# Patient Record
Sex: FEMALE | Race: WHITE | Employment: UNEMPLOYED | URBAN - METROPOLITAN AREA
[De-identification: names, ages, dates, MRNs, and addresses within clinical notes are randomized per-mention and may not be internally consistent; named-entity substitution may affect disease eponyms.]

---

## 2023-01-24 ENCOUNTER — OFFICE VISIT (OUTPATIENT)
Dept: FAMILY MEDICINE CLINIC | Age: 48
End: 2023-01-24
Payer: COMMERCIAL

## 2023-01-24 VITALS
BODY MASS INDEX: 27.49 KG/M2 | DIASTOLIC BLOOD PRESSURE: 74 MMHG | SYSTOLIC BLOOD PRESSURE: 111 MMHG | TEMPERATURE: 98.9 F | HEART RATE: 81 BPM | WEIGHT: 161 LBS | OXYGEN SATURATION: 97 % | HEIGHT: 64 IN

## 2023-01-24 DIAGNOSIS — R01.1 HEART MURMUR: ICD-10-CM

## 2023-01-24 DIAGNOSIS — R51.9 WORSENING HEADACHES: ICD-10-CM

## 2023-01-24 DIAGNOSIS — Z96.21 COCHLEAR IMPLANT IN PLACE: ICD-10-CM

## 2023-01-24 DIAGNOSIS — L65.9 HAIR LOSS: Primary | ICD-10-CM

## 2023-01-24 DIAGNOSIS — R17 YELLOW SKIN: ICD-10-CM

## 2023-01-24 DIAGNOSIS — J34.89 SINUS PRESSURE: ICD-10-CM

## 2023-01-24 PROCEDURE — 99204 OFFICE O/P NEW MOD 45 MIN: CPT | Performed by: STUDENT IN AN ORGANIZED HEALTH CARE EDUCATION/TRAINING PROGRAM

## 2023-01-24 RX ORDER — LIDOCAINE HYDROCHLORIDE 20 MG/ML
SOLUTION ORAL; TOPICAL
COMMUNITY
Start: 2022-12-18

## 2023-01-24 RX ORDER — ZOLPIDEM TARTRATE 12.5 MG/1
TABLET, FILM COATED, EXTENDED RELEASE ORAL
COMMUNITY
Start: 2022-12-28

## 2023-01-24 RX ORDER — MINOXIDIL 5 %
1 SOLUTION, NON-ORAL TOPICAL DAILY
Qty: 120 ML | Refills: 2 | Status: SHIPPED | OUTPATIENT
Start: 2023-01-24

## 2023-01-24 RX ORDER — FLUTICASONE PROPIONATE 50 MCG
SPRAY, SUSPENSION (ML) NASAL
COMMUNITY
Start: 2023-01-11

## 2023-01-24 RX ORDER — FLUTICASONE PROPIONATE 50 MCG
2 SPRAY, SUSPENSION (ML) NASAL DAILY
COMMUNITY
Start: 2022-12-23

## 2023-01-24 RX ORDER — SODIUM, POTASSIUM,MAG SULFATES 17.5-3.13G
SOLUTION, RECONSTITUTED, ORAL ORAL
COMMUNITY
Start: 2023-01-11

## 2023-01-24 NOTE — PROGRESS NOTES
Martha No is an 52 y.o. female who presents for several concerns below    #yellow skin around the eyes  - present a few years and doesn't know what this is  - started out light and annoying, now almost all the time  - changes in severity from day to day but there most of the time  - no work up for this besides normal bilirubin 12/2022  - no pain, itching  - has pressure behind the eyes    #hair loss  - last had full head of hair 3 years ago  - decreased steadily over last several years   - starting to get a little wave back  - has to put on gray powder to cover up the skin  - OB checked the thyroid and that is normal  - hx a lump removed from thyroid several years ago   - tried to spray rogaine a little bit, had some growth on the corners     #fatigue  #sick often   Frequently gets sick or stays sick for a long time  Did just have bronchitis and does feel better  Eye burning - when she had covid.  Wasn't able to stay awake    #cochlear implant  - placed several years ago  - hearing issues since age [age] but total hearing loss a few years ago    #GI tract issues  - seeing specialist  - had an infection ?abdominal   - plan colonoscopy   - then laparscopic exploration surgery with OB    #murmur  - when mentioned this PE finding to patient, she endorses dizziness though denies LOC    #headaches  - brought up at end of visit  - endorses that speech changed in last 2 months per family, thinks it was because of multiple bouts bronchitis  - no numbness/tingling/weakness  - pressure behind eyes  - no eye exam recently  - headaches all of her life  - brain fog    Recent labs from OB  - normal TSH, testosterone, CMP (normal creatinine, potassium, sodium, calcium, total protein, AST/ALT, bilirubin)  - normal CNC (normal WBC, Hgb, MCV, platelet)  - normal vitamin D  HA1c 4.9%  - PAP NORMAL neg HPV, 12/07/2022    Allergies   No Known Allergies      Medications   Current Outpatient Medications   Medication Sig zolpidem CR (AMBIEN CR) 12.5 mg tablet     minoxidiL 5 % soln 1 Applicatorful by Apply Externally route daily. fluticasone propionate (FLONASE) 50 mcg/actuation nasal spray 2 Sprays daily. (Patient not taking: Reported on 1/24/2023)    Lidocaine Viscous 2 % solution GARGLE WITH 15MLS, THEN SPIT OUT, EVERY 6-8 HOURS FOR 3 DAYS (Patient not taking: Reported on 1/24/2023)    Purelax 17 gram packet  (Patient not taking: Reported on 1/24/2023)    sodium-potassium-mag sulfate (SUPREP) 17.5-3.13-1.6 gram solr oral solution  (Patient not taking: Reported on 1/24/2023)     No current facility-administered medications for this visit. Past surgical, medical and social history reviewed and updated as relevant to presenting concerns. ROS: See HPI      OBJECTIVE  Visit Vitals  /74   Pulse 81   Temp 98.9 °F (37.2 °C) (Oral)   Ht 5' 4\" (1.626 m)   Wt 161 lb (73 kg)   SpO2 97%   BMI 27.64 kg/m²       Physical Exam  General appearance - alert, well appearing, and in no distress  Eyes - pupils equal and reactive, extraocular eye movements intact  Ears - cochlear implants in place  Nose - normal and patent, no erythema, discharge or polyps  Mouth - mucous membranes moist, pharynx normal without lesions  Neck - supple, no significant adenopathy  Chest - clear to auscultation, no wheezes, rales or rhonchi, symmetric air entry  Heart - normal rate, regular rhythm, normal S1, S2, + II/VI systolic murmur, rubs, clicks or gallops  Abdomen - soft, nontender, nondistended, no masses or organomegaly  Neurological - alert, oriented, normal speech, no focal findings or movement disorder noted  Musculoskeletal - no joint tenderness, deformity or swelling  Extremities - peripheral pulses normal, no pedal edema, no clubbing or cyanosis  Skin - normal coloration and turgor, no rashes. Skin under eyes not yellow today. Hair pull test negative, no strands. No patches of hair loss or skin change on scalp. ASSESSMENT & PLAN  1. Hair loss  Timeline uncertain, though appears to have paused. Correlated to period of significant stress with other health issues and moving. Normal TSH, testosterone on OB labs. Suspect telogen effluvium secondary to stress. Since the loss has stopped, recommend trial of minoxidil to promote growth. - minoxidiL 5 % soln; 1 Applicatorful by Apply Externally route daily. Dispense: 120 mL; Refill: 2    2. Yellow skin  Normal bilirubin on OB labs. Possibly related to dehydration or gilbert's? But no scleral icetrus during episodes. - REFERRAL TO DERMATOLOGY    3. Sinus pressure  4. Worsening headaches  5. Cochlear implant in place  Pt reports lifelong headaches but different sensation recently. No one sided numbness/tingling/weakness. Cannot get MRI due to cochlear implant. Encouraged pt to also get eye exam  - CT SINUSES W WO CONT; Future  - CT HEAD WO CONT; Future  - REFERRAL TO ENT-OTOLARYNGOLOGY    6. Heart murmur  Given dizziness, will obtain echocardiogram. No LOC. Symptom mentioned after hour long visit, so no other work up obtained today, consider EKG at next visit. - ECHO ADULT COMPLETE; Future      Follow-up and Dispositions    Return in about 4 weeks (around 2/21/2023) for Sleep issues, review results. I have discussed the diagnosis with the patient and the intended plan as seen in the above orders. Patient verbalized understanding of the plan and agrees with the plan. The patient has received an after-visit summary and questions were answered concerning future plans. I have discussed medication side effects and warnings with the patient as well. Informed patient to return to the office if new symptoms arise.         Promise Lopez MD

## 2023-01-24 NOTE — PROGRESS NOTES
Identified pt with two pt identifiers(name and ). Reviewed record in preparation for visit and have obtained necessary documentation. All patient medications has been reviewed. Chief Complaint   Patient presents with    Establish Care     Was in [place] for 6 years, came back to [address] 3 months ago. C/o Hairloss    Currently taking hormone pill (Daylette) for pre menopausal    GI- bloating, abd pain, constipation and diarrhea    Yellowing of under eyes with HA \"pressure around the head and eyes\"    Sudden total hearing loss 2 hears ago and had surgery(cochlear implant) 2020- now its lifelong    Slurred speech started 2 months ago    Mammogram scheduled tomorrow  Colon cancer screening (2023)         Visit Vitals  /74   Pulse 81   Temp 98.9 °F (37.2 °C) (Oral)   Ht 5' 4\" (1.626 m)   Wt 161 lb (73 kg)   SpO2 97%   BMI 27.64 kg/m²     1. Have you been to the ER, urgent care clinic since your last visit? Hospitalized since your last visit? No    2. Have you seen or consulted any other health care providers outside of the [address] [place] [address] since your last visit? Include any pap smears or colon screening.  No

## 2023-02-21 ENCOUNTER — HOSPITAL ENCOUNTER (OUTPATIENT)
Dept: CT IMAGING | Age: 48
Discharge: HOME | End: 2023-02-21
Attending: STUDENT IN AN ORGANIZED HEALTH CARE EDUCATION/TRAINING PROGRAM
Payer: COMMERCIAL

## 2023-02-21 ENCOUNTER — HOSPITAL ENCOUNTER (OUTPATIENT)
Dept: NON INVASIVE DIAGNOSTICS | Age: 48
End: 2023-02-21
Attending: STUDENT IN AN ORGANIZED HEALTH CARE EDUCATION/TRAINING PROGRAM
Payer: COMMERCIAL

## 2023-02-21 ENCOUNTER — HOSPITAL ENCOUNTER (OUTPATIENT)
Dept: CT IMAGING | Age: 48
End: 2023-02-21
Attending: STUDENT IN AN ORGANIZED HEALTH CARE EDUCATION/TRAINING PROGRAM
Payer: COMMERCIAL

## 2023-02-21 DIAGNOSIS — J34.89 SINUS PRESSURE: ICD-10-CM

## 2023-02-21 DIAGNOSIS — R01.1 HEART MURMUR: ICD-10-CM

## 2023-02-21 DIAGNOSIS — Z96.21 COCHLEAR IMPLANT IN PLACE: ICD-10-CM

## 2023-02-21 DIAGNOSIS — R51.9 WORSENING HEADACHES: ICD-10-CM

## 2023-02-21 PROCEDURE — 74011000636 HC RX REV CODE- 636: Performed by: STUDENT IN AN ORGANIZED HEALTH CARE EDUCATION/TRAINING PROGRAM

## 2023-02-21 PROCEDURE — 93306 TTE W/DOPPLER COMPLETE: CPT

## 2023-02-21 PROCEDURE — 70450 CT HEAD/BRAIN W/O DYE: CPT

## 2023-02-21 PROCEDURE — 70488 CT MAXILLOFACIAL W/O & W/DYE: CPT

## 2023-02-21 RX ADMIN — IOPAMIDOL 100 ML: 755 INJECTION, SOLUTION INTRAVENOUS at 09:40

## 2023-02-22 LAB
ECHO AO ASC DIAM: 3.1 CM
ECHO AO ROOT DIAM: 2.8 CM
ECHO AV AREA PEAK VELOCITY: 2.7 CM2
ECHO AV AREA VTI: 2.6 CM2
ECHO AV CUSP MM: 1.6 CM
ECHO AV MEAN GRADIENT: 4 MMHG
ECHO AV MEAN VELOCITY: 1 M/S
ECHO AV PEAK GRADIENT: 8 MMHG
ECHO AV PEAK VELOCITY: 1.4 M/S
ECHO AV VELOCITY RATIO: 0.86
ECHO AV VTI: 29.7 CM
ECHO LA AREA 2C: 13.3 CM2
ECHO LA AREA 4C: 13.8 CM2
ECHO LA DIAMETER: 3.4 CM
ECHO LA MAJOR AXIS: 5 CM
ECHO LA MINOR AXIS: 5.2 CM
ECHO LA TO AORTIC ROOT RATIO: 1.21
ECHO LA VOL BP: 30 ML (ref 22–52)
ECHO LV E' LATERAL VELOCITY: 17 CM/S
ECHO LV E' SEPTAL VELOCITY: 16 CM/S
ECHO LV EDV A2C: 61 ML
ECHO LV EDV A4C: 83 ML
ECHO LV EJECTION FRACTION A2C: 73 %
ECHO LV EJECTION FRACTION A4C: 70 %
ECHO LV EJECTION FRACTION BIPLANE: 72 % (ref 55–100)
ECHO LV ESV A2C: 17 ML
ECHO LV ESV A4C: 25 ML
ECHO LV FRACTIONAL SHORTENING: 36 % (ref 28–44)
ECHO LV GLOBAL LONGITUDINAL STRAIN (GLS): -16.3 %
ECHO LV GLOBAL LONGITUDINAL STRAIN (GLS): -19.8 %
ECHO LV GLOBAL LONGITUDINAL STRAIN (GLS): -21.2 %
ECHO LV GLOBAL LONGITUDINAL STRAIN (GLS): -21.8 %
ECHO LV INTERNAL DIMENSION DIASTOLIC MMODE: 4.5 CM (ref 3.9–5.3)
ECHO LV INTERNAL DIMENSION DIASTOLIC: 4.5 CM (ref 3.9–5.3)
ECHO LV INTERNAL DIMENSION SYSTOLIC MMODE: 2.8 CM
ECHO LV INTERNAL DIMENSION SYSTOLIC: 2.9 CM
ECHO LV IVSD: 0.6 CM (ref 0.6–0.9)
ECHO LV MASS 2D: 78.9 G (ref 67–162)
ECHO LV POSTERIOR WALL DIASTOLIC MMODE: 0.5 CM (ref 0.6–0.9)
ECHO LV POSTERIOR WALL DIASTOLIC: 0.6 CM (ref 0.6–0.9)
ECHO LV RELATIVE WALL THICKNESS RATIO: 0.27
ECHO LVOT AREA: 3.1 CM2
ECHO LVOT AV VTI INDEX: 0.82
ECHO LVOT DIAM: 2 CM
ECHO LVOT MEAN GRADIENT: 3 MMHG
ECHO LVOT PEAK GRADIENT: 6 MMHG
ECHO LVOT PEAK VELOCITY: 1.2 M/S
ECHO LVOT SV: 76.9 ML
ECHO LVOT VTI: 24.5 CM
ECHO MV A VELOCITY: 0.6 M/S
ECHO MV E DECELERATION TIME (DT): 327 MS
ECHO MV E VELOCITY: 0.95 M/S
ECHO MV E/A RATIO: 1.58
ECHO MV E/E' LATERAL: 5.59
ECHO MV E/E' RATIO (AVERAGED): 5.76
ECHO MV E/E' SEPTAL: 5.94
ECHO PV MAX VELOCITY: 0.9 M/S
ECHO PV PEAK GRADIENT: 3 MMHG
ECHO RA AREA 4C: 12.3 CM2
ECHO RA VOLUME: 26 ML
ECHO RV INTERNAL DIMENSION: 2.9 CM
ECHO RV TAPSE: 2 CM (ref 1.7–?)
ECHO RVOT MEAN GRADIENT: 1 MMHG
ECHO RVOT PEAK GRADIENT: 2 MMHG
ECHO RVOT PEAK VELOCITY: 0.7 M/S
ECHO RVOT VTI: 16.3 CM
ECHO TV REGURGITANT MAX VELOCITY: 1.93 M/S
ECHO TV REGURGITANT PEAK GRADIENT: 15 MMHG

## 2023-02-27 ENCOUNTER — TELEPHONE (OUTPATIENT)
Dept: FAMILY MEDICINE CLINIC | Age: 48
End: 2023-02-27

## 2023-02-27 ENCOUNTER — OFFICE VISIT (OUTPATIENT)
Dept: FAMILY MEDICINE CLINIC | Age: 48
End: 2023-02-27
Payer: COMMERCIAL

## 2023-02-27 VITALS
DIASTOLIC BLOOD PRESSURE: 65 MMHG | OXYGEN SATURATION: 99 % | BODY MASS INDEX: 26.88 KG/M2 | TEMPERATURE: 98 F | WEIGHT: 156.6 LBS | SYSTOLIC BLOOD PRESSURE: 96 MMHG | HEART RATE: 78 BPM

## 2023-02-27 DIAGNOSIS — F51.01 PRIMARY INSOMNIA: Primary | ICD-10-CM

## 2023-02-27 PROCEDURE — 99214 OFFICE O/P EST MOD 30 MIN: CPT | Performed by: STUDENT IN AN ORGANIZED HEALTH CARE EDUCATION/TRAINING PROGRAM

## 2023-02-27 RX ORDER — ZOLPIDEM TARTRATE 12.5 MG/1
12.5 TABLET, FILM COATED, EXTENDED RELEASE ORAL
Qty: 15 TABLET | Refills: 0 | Status: SHIPPED | OUTPATIENT
Start: 2023-02-27

## 2023-02-27 RX ORDER — MIRTAZAPINE 15 MG/1
15 TABLET, FILM COATED ORAL
Qty: 30 TABLET | Refills: 1 | Status: SHIPPED | OUTPATIENT
Start: 2023-02-27

## 2023-02-27 NOTE — TELEPHONE ENCOUNTER
Good morning. Pt is scheduled for  today at 1:40 appt. She called and stated that she was informed that she had contact 3 days with someone that now test positive for Covid. Pt stated that she does not have symptoms and home test displays negative. She would like to know if you would be comfortable with her coming to her appt or would you prefer to complete a virtual visit?

## 2023-02-27 NOTE — PROGRESS NOTES
Identified pt with two pt identifiers(name and ). Reviewed record in preparation for visit and have obtained necessary documentation. All patient medications has been reviewed. Chief Complaint   Patient presents with    Insomnia     Follow up for sleep issues - needs refill for Ambien    Visit Vitals  BP 96/65   Pulse 78   Temp 98 °F (36.7 °C) (Oral)   Wt 156 lb 9.6 oz (71 kg)   SpO2 99%   BMI 26.88 kg/m²   1. Have you been to the ER, urgent care clinic since your last visit? Hospitalized since your last visit? No    2. Have you seen or consulted any other health care providers outside of the [address] [place] [address] since your last visit? Include any pap smears or colon screening.  No

## 2023-02-27 NOTE — PROGRESS NOTES
Keila Reese is an 52 y.o. female who presents for sleep concerns    #sleep  - off and on issue since having her third child  - sleeps on back because of soreness in right pelvis  - would be nice if she didn't have to take the sleeping pills  - third child also has trouble sleeping, he is 19. Youngest  - some years are good and some years are not good  - doesn't sleep through the night  - takes the sleeping pill and can go to sleep  - has to get up to use the bathroom periodically  - the pill only lasts about 5 hours  - sometimes awake for a little bit and go back to sleep  - sometimes gives herself terry period  - if she doesn't take anything then she doesn't sleep  - this makes the headaches and tinnitus worse  - OB prescribed [employer] called her and told her not to take it for too long  - time to bed: 10pm weekdays, later on weekends (12)  - time wake up: stays in bed until 8am, weekdays and weekends  - routine before bed: dinner at 6pm, unpacking/watching tv, taking care of things on her phone. Lately more listening.   - has not tried cognitive behavioral therapy for sleep  - does not snore  - lately doesn't feel refreshed when she wakes up   - thinks she is only getting 6 hours of sleep   - on ambien from her OB  - used to take trazodone but it stopped working  - best results when she was on cream of progesterone and melatonin but she is now on a pill with progesterone which has treated her erick-menopausal symptoms  - takes her about 30-60 minutes to fall back asleep    #eyes itching - since covid, has tried artificial tears. has appt with ophthalmology  #sinuses - normal imaging, has appt with ENT    Allergies   No Known Allergies      Medications   Current Outpatient Medications   Medication Sig    DROSPIRENONE-ESTRADIOL PO Take  by mouth. Takes for perimenopausal symptoms    mirtazapine (REMERON) 15 mg tablet Take 1 Tablet by mouth nightly.     zolpidem CR (AMBIEN CR) 12.5 mg tablet Take 1 Tablet by mouth nightly as needed for Sleep. Max Daily Amount: 12.5 mg.    minoxidiL 5 % soln 1 Applicatorful by Apply Externally route daily. (Patient not taking: Reported on 2/27/2023)     No current facility-administered medications for this visit. Past surgical, medical and social history reviewed and updated as relevant to presenting concerns. ROS: See HPI      OBJECTIVE  Visit Vitals  BP 96/65   Pulse 78   Temp 98 °F (36.7 °C) (Oral)   Wt 156 lb 9.6 oz (71 kg)   SpO2 99%   BMI 26.88 kg/m²       Physical Exam  General: No acute distress. HEENT: Conjunctiva are clear, sclera non-icteric. Respiratory: Normal work of breathing, talking in full sentences without issue  Musculoskeletal: Normal gait. Skin: No abnormalities or rashes   Neuro: Alert, oriented, speech clear and coherent  Psychiatric: Normal mood and affect    ASSESSMENT & PLAN  1. Primary insomnia  Discussed sleep hygiene, side effects of medications. Will refill ambien with goal of tapering off this medication. Signed CSA, UDS completed. Will trial mirtazapine in addition to lifestyle modifications. Follow up in 1 month to review efficacy. - mirtazapine (REMERON) 15 mg tablet; Take 1 Tablet by mouth nightly. Dispense: 30 Tablet; Refill: 1  - DRUG SCREEN, URINE; Future  - zolpidem CR (AMBIEN CR) 12.5 mg tablet; Take 1 Tablet by mouth nightly as needed for Sleep. Max Daily Amount: 12.5 mg.  Dispense: 15 Tablet; Refill: 0  - DRUG SCREEN, URINE      Follow-up and Dispositions    Return in about 4 weeks (around 3/27/2023) for Follow up sleep medication. I have discussed the diagnosis with the patient and the intended plan as seen in the above orders. Patient verbalized understanding of the plan and agrees with the plan. The patient has received an after-visit summary and questions were answered concerning future plans. I have discussed medication side effects and warnings with the patient as well.  Informed patient to return to the office if new symptoms arise.         Lydia Barnes MD

## 2023-02-28 LAB
AMPHET UR QL SCN: NEGATIVE
BARBITURATES UR QL SCN: NEGATIVE
BENZODIAZ UR QL: NEGATIVE
CANNABINOIDS UR QL SCN: NEGATIVE
COCAINE UR QL SCN: NEGATIVE
DRUG SCRN COMMENT,DRGCM: NORMAL
METHADONE UR QL: NEGATIVE
OPIATES UR QL: NEGATIVE
PCP UR QL: NEGATIVE

## 2023-05-18 ENCOUNTER — TELEPHONE (OUTPATIENT)
Age: 48
End: 2023-05-18

## 2023-05-18 DIAGNOSIS — F51.01 PRIMARY INSOMNIA: Primary | ICD-10-CM

## 2023-05-18 RX ORDER — TRAZODONE HYDROCHLORIDE 100 MG/1
100 TABLET ORAL NIGHTLY
Qty: 90 TABLET | Refills: 1 | Status: SHIPPED | OUTPATIENT
Start: 2023-05-18

## 2023-05-18 RX ORDER — DAPSONE 75 MG/G
GEL TOPICAL
COMMUNITY
Start: 2023-03-23

## 2023-05-18 RX ORDER — TRAZODONE HYDROCHLORIDE 50 MG/1
50 TABLET ORAL NIGHTLY
COMMUNITY
Start: 2023-05-01 | End: 2023-05-18 | Stop reason: SDUPTHER

## 2023-05-18 RX ORDER — GABAPENTIN 300 MG/1
CAPSULE ORAL
COMMUNITY
Start: 2023-05-09

## 2023-05-18 NOTE — TELEPHONE ENCOUNTER
Patient  came in and stated that patient has been trying to get a refill through [employer] without any success. He stated that she was using Trazodone 100mg and it has worked for her.  She needs a new prescription for the 100mg sent to:      [facility] [address] [place] [address]

## 2023-09-22 ENCOUNTER — HOSPITAL ENCOUNTER (OUTPATIENT)
Facility: HOSPITAL | Age: 48
Discharge: HOME | End: 2023-09-22
Attending: STUDENT IN AN ORGANIZED HEALTH CARE EDUCATION/TRAINING PROGRAM | Admitting: ANESTHESIOLOGY
Payer: COMMERCIAL

## 2023-09-22 VITALS
DIASTOLIC BLOOD PRESSURE: 61 MMHG | OXYGEN SATURATION: 100 % | TEMPERATURE: 98.9 F | BODY MASS INDEX: 26.46 KG/M2 | HEIGHT: 64 IN | WEIGHT: 155 LBS | RESPIRATION RATE: 16 BRPM | SYSTOLIC BLOOD PRESSURE: 97 MMHG | HEART RATE: 65 BPM

## 2023-09-22 DIAGNOSIS — N94.89 PELVIC CONGESTION SYNDROME: ICD-10-CM

## 2023-09-22 PROCEDURE — 6360000004 HC RX CONTRAST MEDICATION: Performed by: STUDENT IN AN ORGANIZED HEALTH CARE EDUCATION/TRAINING PROGRAM

## 2023-09-22 PROCEDURE — 2500000003 HC RX 250 WO HCPCS: Performed by: STUDENT IN AN ORGANIZED HEALTH CARE EDUCATION/TRAINING PROGRAM

## 2023-09-22 PROCEDURE — C1769 GUIDE WIRE: HCPCS

## 2023-09-22 PROCEDURE — 2580000003 HC RX 258: Performed by: STUDENT IN AN ORGANIZED HEALTH CARE EDUCATION/TRAINING PROGRAM

## 2023-09-22 PROCEDURE — 2709999900 IR VASC EMBOLIZE OCCLUDE ARTERY

## 2023-09-22 PROCEDURE — 6360000002 HC RX W HCPCS: Performed by: STUDENT IN AN ORGANIZED HEALTH CARE EDUCATION/TRAINING PROGRAM

## 2023-09-22 RX ORDER — SODIUM CHLORIDE 9 MG/ML
INJECTION, SOLUTION INTRAVENOUS CONTINUOUS PRN
Status: COMPLETED | OUTPATIENT
Start: 2023-09-22 | End: 2023-09-22

## 2023-09-22 RX ORDER — LIDOCAINE HYDROCHLORIDE 10 MG/ML
INJECTION, SOLUTION EPIDURAL; INFILTRATION; INTRACAUDAL; PERINEURAL PRN
Status: COMPLETED | OUTPATIENT
Start: 2023-09-22 | End: 2023-09-22

## 2023-09-22 RX ORDER — FENTANYL CITRATE 50 UG/ML
INJECTION, SOLUTION INTRAMUSCULAR; INTRAVENOUS PRN
Status: COMPLETED | OUTPATIENT
Start: 2023-09-22 | End: 2023-09-22

## 2023-09-22 RX ORDER — MIDAZOLAM HYDROCHLORIDE 2 MG/2ML
INJECTION, SOLUTION INTRAMUSCULAR; INTRAVENOUS PRN
Status: COMPLETED | OUTPATIENT
Start: 2023-09-22 | End: 2023-09-22

## 2023-09-22 RX ORDER — SODIUM TETRADECYL SULFATE 30 MG/ML
10 INJECTION, SOLUTION INTRAVENOUS ONCE
Status: DISCONTINUED | OUTPATIENT
Start: 2023-09-22 | End: 2023-09-22 | Stop reason: HOSPADM

## 2023-09-22 RX ADMIN — MIDAZOLAM HYDROCHLORIDE 2 MG: 1 INJECTION, SOLUTION INTRAMUSCULAR; INTRAVENOUS at 08:43

## 2023-09-22 RX ADMIN — MIDAZOLAM HYDROCHLORIDE 0.5 MG: 1 INJECTION, SOLUTION INTRAMUSCULAR; INTRAVENOUS at 09:08

## 2023-09-22 RX ADMIN — WATER 2000 MG: 1 INJECTION INTRAMUSCULAR; INTRAVENOUS; SUBCUTANEOUS at 08:47

## 2023-09-22 RX ADMIN — FENTANYL CITRATE 25 MCG: 50 INJECTION, SOLUTION INTRAMUSCULAR; INTRAVENOUS at 09:08

## 2023-09-22 RX ADMIN — IOPAMIDOL 113 ML: 755 INJECTION, SOLUTION INTRAVENOUS at 09:24

## 2023-09-22 RX ADMIN — SODIUM CHLORIDE 125 ML/HR: 9 INJECTION, SOLUTION INTRAVENOUS at 08:38

## 2023-09-22 RX ADMIN — LIDOCAINE HYDROCHLORIDE 2 ML: 10 INJECTION, SOLUTION EPIDURAL; INFILTRATION; INTRACAUDAL; PERINEURAL at 08:44

## 2023-09-22 RX ADMIN — FENTANYL CITRATE 50 MCG: 50 INJECTION, SOLUTION INTRAMUSCULAR; INTRAVENOUS at 08:43

## 2023-09-22 ASSESSMENT — PULMONARY FUNCTION TESTS
PIF_VALUE: 0

## 2023-09-22 NOTE — PROGRESS NOTES
Name of procedure:  PELVIC VENOGRAM     Sedation medications given: versed 2.5mg, fentanyl 75mcg     Sedation tolerated: well     Total Procedure time: 42min     Vital Signs: stable     Any complications related to procedure: see orders     Post Procedure Care Needed/order sets placed in connect care: see orders     Pt tolerated procedure well. VSS. No C/O pain. Dressing to site D&I. No bleeding or hematoma noted to site. IV D/Cd. Discharge instructions given. Copy on chart and copy given to pt. Pt verbalized understanding. Pt taken to car by wheelchair and taken home by family. NAD noted at time of discharge.

## 2023-09-22 NOTE — H&P
INTERVENTIONAL RADIOLOGY  Preoperative History and Physical      Patient:  Giselle Keating  :  1975  Age:  50 y.o. MRN:  [id number]  Today's Date:  2023      CC / HPI   Giselle Keating is a 50 y.o. female with a history of chronic pelvic pain who presents for pelvic venogram and ovarian vein embolization. PAST MEDICAL HISTORY  Past Medical History:   Diagnosis Date    Hearing loss        PAST SURGICAL HISTORY  Past Surgical History:   Procedure Laterality Date    COCHLEAR IMPLANT      OTHER SURGICAL HISTORY      vaginal wall lesion removed    OTHER SURGICAL HISTORY      tongue lesion removed    THYROIDECTOMY, PARTIAL      TONSILLECTOMY         SOCIAL HISTORY  Social History     Socioeconomic History    Marital status:      Spouse name: Not on file    Number of children: Not on file    Years of education: Not on file    Highest education level: Not on file   Occupational History    Not on file   Tobacco Use    Smoking status: Never    Smokeless tobacco: Never   Substance and Sexual Activity    Alcohol use: Not Currently    Drug use: Never    Sexual activity: Not on file   Other Topics Concern    Not on file   Social History Narrative    Not on file     Social Determinants of Health     Financial Resource Strain: Not on file   Food Insecurity: Not on file   Transportation Needs: Not on file   Physical Activity: Not on file   Stress: Not on file   Social Connections: Not on file   Intimate Partner Violence: Not on file   Housing Stability: Not on file       FAMILY HISTORY  History reviewed. No pertinent family history.     CURRENT MEDICATIONS  Current Facility-Administered Medications   Medication Dose Route Frequency Provider Last Rate Last Admin    sodium tetradecyl sulfate (SOTRADECOL) 3 % injection 10 mL  10 mL IntraVENous Once Alonzo Cox MD           ALLERGIES  No Known Allergies    DIAGNOSTIC STUDIES   IMAGING STUDIES  Relevant Imaging studies reviewed    LABS  No results found for:

## 2023-09-22 NOTE — PROGRESS NOTES
Pt arrives ambulatory to angio department accompanied by son for pelvic embo procedure. All assessments completed and consent was reviewed. Education given was regarding procedure, moderate sedation, post-procedure care and  management/follow-up. Opportunity for questions was provided and all questions and concerns were addressed.

## 2023-11-07 DIAGNOSIS — F51.01 PRIMARY INSOMNIA: ICD-10-CM

## 2023-11-07 RX ORDER — TRAZODONE HYDROCHLORIDE 100 MG/1
100 TABLET ORAL NIGHTLY
Qty: 90 TABLET | Refills: 1 | Status: SHIPPED | OUTPATIENT
Start: 2023-11-07

## 2023-11-12 ENCOUNTER — HOSPITAL ENCOUNTER (OUTPATIENT)
Facility: HOSPITAL | Age: 48
Discharge: HOME | End: 2023-11-15
Attending: STUDENT IN AN ORGANIZED HEALTH CARE EDUCATION/TRAINING PROGRAM
Payer: COMMERCIAL

## 2023-11-12 DIAGNOSIS — N94.89 PELVIC CONGESTION SYNDROME: ICD-10-CM

## 2023-11-12 PROCEDURE — 74177 CT ABD & PELVIS W/CONTRAST: CPT

## 2023-11-12 PROCEDURE — 6360000004 HC RX CONTRAST MEDICATION: Performed by: STUDENT IN AN ORGANIZED HEALTH CARE EDUCATION/TRAINING PROGRAM

## 2023-11-12 RX ADMIN — IOPAMIDOL 100 ML: 755 INJECTION, SOLUTION INTRAVENOUS at 14:18

## 2023-11-15 ENCOUNTER — OFFICE VISIT (OUTPATIENT)
Age: 48
End: 2023-11-15
Payer: COMMERCIAL

## 2023-11-15 VITALS
OXYGEN SATURATION: 100 % | BODY MASS INDEX: 27.93 KG/M2 | TEMPERATURE: 98.1 F | SYSTOLIC BLOOD PRESSURE: 110 MMHG | RESPIRATION RATE: 18 BRPM | HEIGHT: 64 IN | WEIGHT: 163.58 LBS | DIASTOLIC BLOOD PRESSURE: 76 MMHG | HEART RATE: 97 BPM

## 2023-11-15 DIAGNOSIS — I95.89 HYPOTENSION DUE TO HYPOVOLEMIA: Primary | ICD-10-CM

## 2023-11-15 DIAGNOSIS — E86.1 HYPOTENSION DUE TO HYPOVOLEMIA: Primary | ICD-10-CM

## 2023-11-15 PROCEDURE — 99213 OFFICE O/P EST LOW 20 MIN: CPT | Performed by: STUDENT IN AN ORGANIZED HEALTH CARE EDUCATION/TRAINING PROGRAM

## 2023-11-15 RX ORDER — PAROXETINE 10 MG/1
10 TABLET, FILM COATED ORAL DAILY
COMMUNITY
Start: 2023-11-09

## 2023-11-15 RX ORDER — DROSPIRENONE AND ETHINYL ESTRADIOL 0.03MG-3MG
1 KIT ORAL DAILY
COMMUNITY
Start: 2023-11-06

## 2023-11-15 SDOH — ECONOMIC STABILITY: FOOD INSECURITY: WITHIN THE PAST 12 MONTHS, YOU WORRIED THAT YOUR FOOD WOULD RUN OUT BEFORE YOU GOT MONEY TO BUY MORE.: NEVER TRUE

## 2023-11-15 SDOH — ECONOMIC STABILITY: HOUSING INSECURITY
IN THE LAST 12 MONTHS, WAS THERE A TIME WHEN YOU DID NOT HAVE A STEADY PLACE TO SLEEP OR SLEPT IN A SHELTER (INCLUDING NOW)?: NO

## 2023-11-15 SDOH — ECONOMIC STABILITY: FOOD INSECURITY: WITHIN THE PAST 12 MONTHS, THE FOOD YOU BOUGHT JUST DIDN'T LAST AND YOU DIDN'T HAVE MONEY TO GET MORE.: NEVER TRUE

## 2023-11-15 SDOH — ECONOMIC STABILITY: INCOME INSECURITY: HOW HARD IS IT FOR YOU TO PAY FOR THE VERY BASICS LIKE FOOD, HOUSING, MEDICAL CARE, AND HEATING?: NOT HARD AT ALL

## 2023-11-15 ASSESSMENT — PATIENT HEALTH QUESTIONNAIRE - PHQ9
2. FEELING DOWN, DEPRESSED OR HOPELESS: 0
SUM OF ALL RESPONSES TO PHQ9 QUESTIONS 1 & 2: 2
SUM OF ALL RESPONSES TO PHQ QUESTIONS 1-9: 2
1. LITTLE INTEREST OR PLEASURE IN DOING THINGS: 2

## 2023-11-15 NOTE — PROGRESS NOTES
[facility]      Chief Complaint:     Chief Complaint   Patient presents with    Hypotension     Couple of weeks ago       Mariposa Abernathy is a 50 y.o. female that presents for: Low BP      Assessment/Plan:   I personally reviewed the following Pertinent Labs/Studies:   - Encounter notes, labs, and imaging from 2/21/23. Pt is a 50year old female presenting with episodes of low blood pressure. Regine was seen today for hypotension. Diagnoses and all orders for this visit:    Hypotension due to hypovolemia: most likely 2/2 low volume from inadequate fluid intake in the s/o pre-existing low baseline BP. Orthostatics negative and BP normal today. No syncopal events or palpitations so cardiac workup not warranted at this time. Pt had echo in 02/2023 that was normal. If symptoms fail to respond to liberal fluid intake (encouraged at least 96 ounces daily) with added salt (recommended approx 3-4 grams daily total) then would need to pursue further workup at follow up in a couple weeks. Would check electrolytes, urinalysis, and blood counts at a minimum and possibly urine lytes, aldosterone and cortisol level to check for salt wasting or adrenal insufficiency. Recent AP CT without acute adrenal or renal anomaly. Follow up:     Return in about 2 weeks (around 11/29/2023) for follow up on hypotension. .     Subjective:   HPI:  Mariposa Abernathy is a 50 y.o. female that presents for:    Has had low BP per whole life. Usually doesn't bother her. 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 Have been 90's over 60's. Has had one episode of light-headedness when she stands up. No \"gray-outs\" (what she describes as almost blacking out). Has had some trouble focusing and feels the low BP's are behind it. No episodes of LOC. She denies SOB, diaphoresis, chest pain, palpitations, edema. Has frequent nighttime urination but no other urinary symptoms.

## 2023-11-29 ENCOUNTER — OFFICE VISIT (OUTPATIENT)
Age: 48
End: 2023-11-29
Payer: COMMERCIAL

## 2023-11-29 VITALS
WEIGHT: 163 LBS | TEMPERATURE: 98.7 F | DIASTOLIC BLOOD PRESSURE: 71 MMHG | RESPIRATION RATE: 18 BRPM | BODY MASS INDEX: 27.83 KG/M2 | HEART RATE: 83 BPM | HEIGHT: 64 IN | OXYGEN SATURATION: 98 % | SYSTOLIC BLOOD PRESSURE: 101 MMHG

## 2023-11-29 DIAGNOSIS — I95.9 HYPOTENSION, UNSPECIFIED HYPOTENSION TYPE: Primary | ICD-10-CM

## 2023-11-29 DIAGNOSIS — E89.0 HISTORY OF PARTIAL THYROIDECTOMY: ICD-10-CM

## 2023-11-29 PROBLEM — F98.8 ATTENTION DEFICIT DISORDER WITHOUT HYPERACTIVITY: Status: ACTIVE | Noted: 2023-11-29

## 2023-11-29 PROCEDURE — 99213 OFFICE O/P EST LOW 20 MIN: CPT | Performed by: FAMILY MEDICINE

## 2023-11-29 ASSESSMENT — PATIENT HEALTH QUESTIONNAIRE - PHQ9
SUM OF ALL RESPONSES TO PHQ QUESTIONS 1-9: 0
2. FEELING DOWN, DEPRESSED OR HOPELESS: 0
SUM OF ALL RESPONSES TO PHQ9 QUESTIONS 1 & 2: 0
SUM OF ALL RESPONSES TO PHQ QUESTIONS 1-9: 0
SUM OF ALL RESPONSES TO PHQ QUESTIONS 1-9: 0
1. LITTLE INTEREST OR PLEASURE IN DOING THINGS: 0
SUM OF ALL RESPONSES TO PHQ QUESTIONS 1-9: 0

## 2023-11-29 NOTE — PROGRESS NOTES
[facility] Office Visit     Assessment/ Plan: Arnaldo Abdullahi is a 50 y.o. female presenting for:    Hypotension - Chronic, unclear etiology. Here today to discuss further lab work-up. Has liberalized salt and water intake with mild improvement in symptoms. Work-up today to eval for thyroid dysfunction, adrenal insufficiency, salt wasting, anemia. Continue liberal salt and fluid intake. Unable to tolerate compression socks. ECHO earlier this year without significant findings, no valvular anomalies. Interestingly, is having work-up with gyn for ongoing pelvic pain/congestion and IBS issues. No significant pulmonary issues. BP Readings from Last 3 Encounters:   11/29/23 101/71   11/15/23 110/76   09/22/23 97/61   -- no significant tachycardia though could consider tilt-table testing with neuro  --  CBC, CMP, AM cortisol, aldosterone, Mg/Phos, urine studies, B12/folate, Vitamin D    20 minutes were spent on the day of this encounter both with the patient and in related activities including chart review, care coordination and counseling. Patient instructions were discussed and/or provided in the AVS. The patient understands and agrees to the plan. RETURN TO CARE: pending labs   No future appointments. Subjective:  Chief Complaint   Patient presents with    Hypotension     Patient is coming in for hypotension follow up. No other concerns.       HPI:   Arnaldo Abdullahi is a 50 y.o. female with PMH of cochlear implant, ADD here for hypotension.     - has had whole life  - worried about driving  - has never passed out, gets confusion/lack of focus with driving - was thought at some point ADHD  has tried caffeine, salt, water  - has seen cardiologist twice   - sometimes lightheadedness   - increased water intake since last visit  8-10 glasses per day, alternating with caffeine  always puts salt on food, switched to healthier eating   - couldn't wear compression hose/socks -

## 2023-12-01 ENCOUNTER — NURSE ONLY (OUTPATIENT)
Age: 48
End: 2023-12-01

## 2023-12-01 DIAGNOSIS — E89.0 HISTORY OF PARTIAL THYROIDECTOMY: ICD-10-CM

## 2023-12-01 DIAGNOSIS — I95.9 HYPOTENSION, UNSPECIFIED HYPOTENSION TYPE: ICD-10-CM

## 2023-12-01 LAB
25(OH)D3 SERPL-MCNC: 25.1 NG/ML (ref 30–100)
ALBUMIN SERPL-MCNC: 4 G/DL (ref 3.5–5)
ALBUMIN/GLOB SERPL: 1.3 (ref 1.1–2.2)
ALP SERPL-CCNC: 47 U/L (ref 45–117)
ALT SERPL-CCNC: 22 U/L (ref 12–78)
ANION GAP SERPL CALC-SCNC: 8 MMOL/L (ref 5–15)
APPEARANCE UR: CLEAR
AST SERPL-CCNC: 17 U/L (ref 15–37)
BACTERIA URNS QL MICRO: NEGATIVE /HPF
BASOPHILS # BLD: 0.1 K/UL (ref 0–0.1)
BASOPHILS NFR BLD: 1 % (ref 0–1)
BILIRUB SERPL-MCNC: 0.3 MG/DL (ref 0.2–1)
BILIRUB UR QL: NEGATIVE
BUN SERPL-MCNC: 13 MG/DL (ref 6–20)
BUN/CREAT SERPL: 19 (ref 12–20)
CALCIUM SERPL-MCNC: 9.2 MG/DL (ref 8.5–10.1)
CHLORIDE SERPL-SCNC: 101 MMOL/L (ref 97–108)
CO2 SERPL-SCNC: 24 MMOL/L (ref 21–32)
COLOR UR: NORMAL
CORTIS AM PEAK SERPL-MCNC: 19.2 UG/DL (ref 4.3–22.45)
CREAT SERPL-MCNC: 0.69 MG/DL (ref 0.55–1.02)
CREAT UR-MCNC: 37.6 MG/DL
DIFFERENTIAL METHOD BLD: NORMAL
EOSINOPHIL # BLD: 0.1 K/UL (ref 0–0.4)
EOSINOPHIL NFR BLD: 1 % (ref 0–7)
EPITH CASTS URNS QL MICRO: NORMAL /LPF
ERYTHROCYTE [DISTWIDTH] IN BLOOD BY AUTOMATED COUNT: 11.9 % (ref 11.5–14.5)
FOLATE SERPL-MCNC: 5.9 NG/ML (ref 5–21)
GLOBULIN SER CALC-MCNC: 3.2 G/DL (ref 2–4)
GLUCOSE SERPL-MCNC: 122 MG/DL (ref 65–100)
GLUCOSE UR STRIP.AUTO-MCNC: NEGATIVE MG/DL
HCT VFR BLD AUTO: 39.6 % (ref 35–47)
HGB BLD-MCNC: 13.4 G/DL (ref 11.5–16)
HGB UR QL STRIP: NEGATIVE
HYALINE CASTS URNS QL MICRO: NORMAL /LPF (ref 0–5)
IMM GRANULOCYTES # BLD AUTO: 0 K/UL (ref 0–0.04)
IMM GRANULOCYTES NFR BLD AUTO: 0 % (ref 0–0.5)
KETONES UR QL STRIP.AUTO: NEGATIVE MG/DL
LEUKOCYTE ESTERASE UR QL STRIP.AUTO: NEGATIVE
LYMPHOCYTES # BLD: 1.5 K/UL (ref 0.8–3.5)
LYMPHOCYTES NFR BLD: 21 % (ref 12–49)
MAGNESIUM SERPL-MCNC: 2.1 MG/DL (ref 1.6–2.4)
MCH RBC QN AUTO: 31.2 PG (ref 26–34)
MCHC RBC AUTO-ENTMCNC: 33.8 G/DL (ref 30–36.5)
MCV RBC AUTO: 92.3 FL (ref 80–99)
MONOCYTES # BLD: 0.4 K/UL (ref 0–1)
MONOCYTES NFR BLD: 5 % (ref 5–13)
NEUTS SEG # BLD: 5.2 K/UL (ref 1.8–8)
NEUTS SEG NFR BLD: 72 % (ref 32–75)
NITRITE UR QL STRIP.AUTO: NEGATIVE
NRBC # BLD: 0 K/UL (ref 0–0.01)
NRBC BLD-RTO: 0 PER 100 WBC
PH UR STRIP: 6.5 (ref 5–8)
PHOSPHATE SERPL-MCNC: 2.9 MG/DL (ref 2.6–4.7)
PLATELET # BLD AUTO: 291 K/UL (ref 150–400)
PMV BLD AUTO: 11.5 FL (ref 8.9–12.9)
POTASSIUM SERPL-SCNC: 4 MMOL/L (ref 3.5–5.1)
PROT SERPL-MCNC: 7.2 G/DL (ref 6.4–8.2)
PROT UR STRIP-MCNC: NEGATIVE MG/DL
RBC # BLD AUTO: 4.29 M/UL (ref 3.8–5.2)
RBC #/AREA URNS HPF: NORMAL /HPF (ref 0–5)
SODIUM SERPL-SCNC: 133 MMOL/L (ref 136–145)
SODIUM UR-SCNC: 33 MMOL/L
SP GR UR REFRACTOMETRY: 1.01 (ref 1–1.03)
TSH SERPL DL<=0.05 MIU/L-ACNC: 0.85 UIU/ML (ref 0.36–3.74)
UROBILINOGEN UR QL STRIP.AUTO: 0.2 EU/DL (ref 0.2–1)
VIT B12 SERPL-MCNC: 403 PG/ML (ref 193–986)
WBC # BLD AUTO: 7.2 K/UL (ref 3.6–11)
WBC URNS QL MICRO: NORMAL /HPF (ref 0–4)

## 2023-12-05 LAB — ALDOST SERPL-MCNC: 26.1 NG/DL (ref 0–30)

## 2023-12-27 ENCOUNTER — OFFICE VISIT (OUTPATIENT)
Age: 48
End: 2023-12-27
Payer: COMMERCIAL

## 2023-12-27 VITALS
RESPIRATION RATE: 18 BRPM | TEMPERATURE: 98.2 F | WEIGHT: 166 LBS | DIASTOLIC BLOOD PRESSURE: 59 MMHG | SYSTOLIC BLOOD PRESSURE: 90 MMHG | OXYGEN SATURATION: 98 % | HEIGHT: 64 IN | BODY MASS INDEX: 28.34 KG/M2 | HEART RATE: 68 BPM

## 2023-12-27 DIAGNOSIS — I95.9 HYPOTENSION, UNSPECIFIED HYPOTENSION TYPE: Primary | ICD-10-CM

## 2023-12-27 DIAGNOSIS — N93.9 ABNORMAL UTERINE BLEEDING (AUB): ICD-10-CM

## 2023-12-27 DIAGNOSIS — R73.01 ELEVATED FASTING BLOOD SUGAR: ICD-10-CM

## 2023-12-27 DIAGNOSIS — E87.1 HYPONATREMIA: ICD-10-CM

## 2023-12-27 DIAGNOSIS — E55.9 VITAMIN D DEFICIENCY: ICD-10-CM

## 2023-12-27 PROCEDURE — 99214 OFFICE O/P EST MOD 30 MIN: CPT | Performed by: FAMILY MEDICINE

## 2023-12-27 RX ORDER — DOXYCYCLINE HYCLATE 100 MG
TABLET ORAL
COMMUNITY
Start: 2023-12-18

## 2023-12-27 RX ORDER — MIDODRINE HYDROCHLORIDE 2.5 MG/1
2.5 TABLET ORAL 3 TIMES DAILY
Qty: 90 TABLET | Refills: 2 | Status: SHIPPED | OUTPATIENT
Start: 2023-12-27

## 2023-12-27 RX ORDER — ERGOCALCIFEROL 1.25 MG/1
50000 CAPSULE ORAL WEEKLY
Qty: 12 CAPSULE | Refills: 1 | Status: SHIPPED | OUTPATIENT
Start: 2023-12-27

## 2023-12-27 NOTE — PROGRESS NOTES
[address] [facility] Visit     Assessment/ Plan: Hayde Davis is a 50 y.o. female presenting for:    Hypotension  Fatigue, Brain Fog - Chronic, still unclear etiology though did have mild hyponatremia. Recommended further work-up to include nephrology referral and tilt-table testing. Will trial small dose of midodrine - counseled on risks/benefits, advised to avoid use within 6-hours of sleep to prevent orthostatic hypertension. Recommended compression socks, continued fluid/salt intake. Reviewed all lab results from most recent visit. Had CT head that was okay, ECHO okay, could consider nephrology referral.     Vitamin D deficiency - Trial high dose Vitamin D to help with significant fatigue. Elevated Fasting Blood Sugar - A1C okay but  which is borderline for diabetes. Could consider CBG as possible work-up for etiology of brain fog. The 10-year ASCVD risk score (Richie GOMEZ, et al., 2019) is: 0.4%. 30 minutes were spent on the day of this encounter both with the patient and in related activities including chart review, care coordination and counseling. Patient instructions were discussed and/or provided in the AVS. The patient understands and agrees to the plan. RETURN TO CARE: 1 month   Future Appointments   Date Time Provider [address]   2/7/2024  4:00 PM Evelyn Mendoza DO [facility]       Subjective:  Chief Complaint   Patient presents with    Discuss Labs     Patient is coming in for a follow up on lab results. Patient states that she has been having low blood pressures for over 10 years. She said that she gets disoriented, brain fog and light headed when driving. No other concerns. HPI:   Hayde Davis is a 50 y.o. female with PMH of cochlear implant, ADD, Vitamin D deficiency, history of partial thyroidectomy here for follow-up BP.      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

## 2023-12-28 LAB
CHOLEST SERPL-MCNC: 226 MG/DL
EST. AVERAGE GLUCOSE BLD GHB EST-MCNC: 94 MG/DL
HBA1C MFR BLD: 4.9 % (ref 4–5.6)
HDLC SERPL-MCNC: 83 MG/DL
HDLC SERPL: 2.7 (ref 0–5)
LDLC SERPL CALC-MCNC: 116.8 MG/DL (ref 0–100)
TRIGL SERPL-MCNC: 131 MG/DL
VLDLC SERPL CALC-MCNC: 26.2 MG/DL

## 2023-12-31 PROBLEM — N93.9 ABNORMAL UTERINE BLEEDING (AUB): Status: ACTIVE | Noted: 2023-12-31

## 2023-12-31 PROBLEM — E55.9 VITAMIN D DEFICIENCY: Status: ACTIVE | Noted: 2023-12-31

## 2023-12-31 PROBLEM — R73.01 ELEVATED FASTING BLOOD SUGAR: Status: ACTIVE | Noted: 2023-12-31

## 2024-02-23 ENCOUNTER — OFFICE VISIT (OUTPATIENT)
Age: 49
End: 2024-02-23
Payer: COMMERCIAL

## 2024-02-23 VITALS
BODY MASS INDEX: 27.49 KG/M2 | DIASTOLIC BLOOD PRESSURE: 64 MMHG | HEART RATE: 71 BPM | WEIGHT: 161 LBS | RESPIRATION RATE: 18 BRPM | SYSTOLIC BLOOD PRESSURE: 95 MMHG | TEMPERATURE: 98.4 F | OXYGEN SATURATION: 97 % | HEIGHT: 64 IN

## 2024-02-23 DIAGNOSIS — E87.1 HYPONATREMIA: ICD-10-CM

## 2024-02-23 DIAGNOSIS — I95.9 HYPOTENSION, UNSPECIFIED HYPOTENSION TYPE: Primary | ICD-10-CM

## 2024-02-23 PROCEDURE — 99213 OFFICE O/P EST LOW 20 MIN: CPT | Performed by: FAMILY MEDICINE

## 2024-02-23 RX ORDER — PAROXETINE 10 MG/1
10 TABLET, FILM COATED ORAL EVERY MORNING
COMMUNITY
Start: 2023-11-09

## 2024-02-23 ASSESSMENT — PATIENT HEALTH QUESTIONNAIRE - PHQ9: DEPRESSION UNABLE TO ASSESS: PT REFUSES

## 2024-02-23 NOTE — PROGRESS NOTES
[facility] Office Visit     Assessment/ Plan: Regine Olea is a 48 y.o. female presenting for:    Symptomatic Hypotension - Acute on chronic, much improvement with midodrine. Continues to increase fluids. Has been restricting calories somewhat which likely also contributing, trying to lose weight. Has not had nephrology appointment but planning to call to reschedule. Faxed referral form for tilt table testing.     Mild Hypotonic Hyponatremia - 133, corrected to 134. Urine sodium 33, encouraged increased fluids, no suspicion for extrarenal losses, normal AM cortisol, low suspicion for adrenal insufficiency.     Weight - Desires weight loss, limiting calories but stagnant weight loss. Counseled on muscle building, nutrition, continued aerobic exercise.     20 minutes were spent on the day of this encounter both with the patient and in related activities including chart review, care coordination and counseling.     Patient instructions were discussed and/or provided in the AVS. The patient understands and agrees to the plan.    RETURN TO CARE: after nephrology appt   No future appointments.    Subjective:  Chief Complaint   Patient presents with    Blood Pressure Check     Patient is coming in for a blood pressure follow up. No other concerns.      HPI:   Regine Olea is a 48 y.o. female with PMH of AUB, ADHD, Vitamin D deficiency, history of partial thyroidectomy here for follow-up.     Hyponatremia, Hypotension/Fog  - tilt-table testing, nephrology referral (kidney appt will schedule)   - trial small dose of midodrine - taking during week, not as much on weekend which is usually when BP crashes happen   - send details for tilt table **    Weight  - trouble losing weight and wondering about appetite stimulant   - [id number] calories and fatigued   - less bleeding   - fruit in morning, salad/chicken for lunch, pre-made dinners  diet drinks, tea, monster, water   - physical activity

## 2024-02-23 NOTE — PROGRESS NOTES
Regine Olea is a 48 y.o. female      Chief Complaint   Patient presents with    Blood Pressure Check     Patient is coming in for a blood pressure follow up. No other concerns.        \"Have you been to the ER, urgent care clinic since your last visit?  Hospitalized since your last visit?\"    NO    “Have you seen or consulted any other health care providers outside of [facility] since your last visit?”    NO              Vitals:    02/23/24 1629   BP: 95/64   Site: Right Upper Arm   Position: Sitting   Pulse: 71   Resp: 18   Temp: 98.4 °F (36.9 °C)   TempSrc: Oral   SpO2: 97%   Weight: 73 kg (161 lb)   Height: 1.626 m (5' 4\")            Health Maintenance Due   Topic Date Due    Hepatitis B vaccine (1 of 3 - 3-dose series) Never done    Pneumococcal 0-64 years Vaccine (1 - PCV) Never done    HIV screen  Never done    Hepatitis C screen  Never done    DTaP/Tdap/Td vaccine (1 - Tdap) Never done    Flu vaccine (1) 08/01/2023    COVID-19 Vaccine (4 - 2023-24 season) 09/01/2023         Medication Reconciliation completed, changes noted.  Please  Update medication list.

## 2024-02-27 DIAGNOSIS — F51.01 PRIMARY INSOMNIA: ICD-10-CM

## 2024-02-29 RX ORDER — TRAZODONE HYDROCHLORIDE 100 MG/1
100 TABLET ORAL NIGHTLY
Qty: 90 TABLET | Refills: 0 | Status: SHIPPED | OUTPATIENT
Start: 2024-02-29

## 2024-03-01 ENCOUNTER — TELEPHONE (OUTPATIENT)
Age: 49
End: 2024-03-01

## 2024-03-01 NOTE — TELEPHONE ENCOUNTER
Called and spoke to  to set up appt she is out of town and can access [employer] or I will call back tuesday

## 2024-03-24 DIAGNOSIS — I95.9 HYPOTENSION, UNSPECIFIED HYPOTENSION TYPE: ICD-10-CM

## 2024-03-24 DIAGNOSIS — E87.1 HYPONATREMIA: ICD-10-CM

## 2024-03-25 RX ORDER — MIDODRINE HYDROCHLORIDE 2.5 MG/1
2.5 TABLET ORAL 3 TIMES DAILY
Qty: 90 TABLET | Refills: 5 | Status: SHIPPED | OUTPATIENT
Start: 2024-03-25

## 2025-08-16 ENCOUNTER — TRANSCRIBE ORDERS (OUTPATIENT)
Facility: HOSPITAL | Age: 50
End: 2025-08-16

## 2025-08-16 ENCOUNTER — HOSPITAL ENCOUNTER (OUTPATIENT)
Facility: HOSPITAL | Age: 50
Discharge: HOME | End: 2025-08-19
Payer: COMMERCIAL

## 2025-08-16 DIAGNOSIS — R19.4 CHANGE IN BOWEL HABITS: ICD-10-CM

## 2025-08-16 DIAGNOSIS — K59.09 OTHER CONSTIPATION: Primary | ICD-10-CM

## 2025-08-16 DIAGNOSIS — K59.09 OTHER CONSTIPATION: ICD-10-CM

## 2025-08-16 PROCEDURE — 74018 RADEX ABDOMEN 1 VIEW: CPT
